# Patient Record
Sex: FEMALE | NOT HISPANIC OR LATINO | Employment: OTHER | URBAN - METROPOLITAN AREA
[De-identification: names, ages, dates, MRNs, and addresses within clinical notes are randomized per-mention and may not be internally consistent; named-entity substitution may affect disease eponyms.]

---

## 2017-08-16 ENCOUNTER — NEW REFERRAL (OUTPATIENT)
Dept: URBAN - METROPOLITAN AREA CLINIC 33 | Facility: CLINIC | Age: 82
End: 2017-08-16

## 2017-08-16 VITALS
BODY MASS INDEX: 34.96 KG/M2 | HEIGHT: 62 IN | HEART RATE: 65 BPM | SYSTOLIC BLOOD PRESSURE: 154 MMHG | DIASTOLIC BLOOD PRESSURE: 84 MMHG | WEIGHT: 190 LBS

## 2017-08-16 DIAGNOSIS — H04.123: ICD-10-CM

## 2017-08-16 DIAGNOSIS — H43.393: ICD-10-CM

## 2017-08-16 DIAGNOSIS — Z79.4: ICD-10-CM

## 2017-08-16 DIAGNOSIS — E11.3293: ICD-10-CM

## 2017-08-16 PROCEDURE — 2022F DILAT RTA XM EVC RTNOPTHY: CPT

## 2017-08-16 PROCEDURE — G8417 CALC BMI ABV UP PARAM F/U: HCPCS

## 2017-08-16 PROCEDURE — 92235 FLUORESCEIN ANGRPH MLTIFRAME: CPT

## 2017-08-16 PROCEDURE — 99204 OFFICE O/P NEW MOD 45 MIN: CPT

## 2017-08-16 PROCEDURE — 5010F MACUL RESULT PHY/QHP MNG DM: CPT

## 2017-08-16 PROCEDURE — 2021F DILAT MACULAR EXAM DONE: CPT

## 2017-08-16 PROCEDURE — G8427 DOCREV CUR MEDS BY ELIG CLIN: HCPCS

## 2017-08-16 PROCEDURE — 92250 FUNDUS PHOTOGRAPHY W/I&R: CPT

## 2017-08-16 PROCEDURE — G8397 DIL MACULA/FUNDUS EXAM/W DOC: HCPCS

## 2017-08-16 PROCEDURE — 1036F TOBACCO NON-USER: CPT

## 2017-08-16 ASSESSMENT — VISUAL ACUITY
OD_PH: 20/25+2
OD_SC: 20/25-2
OS_SC: 20/25-2
OD_CC: 20/25+1
OS_CC: 20/30+2

## 2017-08-16 ASSESSMENT — TONOMETRY
OD_IOP_MMHG: 17
OS_IOP_MMHG: 19

## 2020-08-24 ENCOUNTER — OFFICE VISIT (OUTPATIENT)
Dept: URBAN - METROPOLITAN AREA CLINIC 66 | Facility: CLINIC | Age: 85
End: 2020-08-24

## 2020-08-24 ENCOUNTER — TELEPHONE ENCOUNTER (OUTPATIENT)
Dept: URBAN - METROPOLITAN AREA CLINIC 68 | Facility: CLINIC | Age: 85
End: 2020-08-24

## 2020-09-23 ENCOUNTER — TELEPHONE ENCOUNTER (OUTPATIENT)
Dept: URBAN - METROPOLITAN AREA CLINIC 68 | Facility: CLINIC | Age: 85
End: 2020-09-23

## 2021-07-27 ENCOUNTER — OFFICE VISIT (OUTPATIENT)
Dept: URBAN - METROPOLITAN AREA CLINIC 68 | Facility: CLINIC | Age: 86
End: 2021-07-27

## 2021-07-30 NOTE — PATIENT DISCUSSION
Reviewed risks and benefits with patient of AREDS formulation for preventing progression of AMD. Also, reviewed pros and cons of Genotype based vitamin therapy for AMD. Patient instructed on the use of the [address] and asked to check their vision daily and to return for evaluation no later than 72 hours after any new changes. Patient instructed to avoid smoking, eat a healthy diet, exercise regularly if approved by their internist, monitor their weight and BMI and try and keep it in a normal range. For patients not on Coumadin eating a diet rich in green leafy vegtables is recommended.

## 2021-07-30 NOTE — PATIENT DISCUSSION
7/30/21: MOST LIKELY CAUSE IS PVD, BUT WILL MONITOR IN 3 MO FOR RESOLUTION. Recommended OBSERVATION. Patient understands condition, prognosis and need for follow up care.

## 2021-08-24 ENCOUNTER — OFFICE VISIT (OUTPATIENT)
Dept: URBAN - METROPOLITAN AREA CLINIC 68 | Facility: CLINIC | Age: 86
End: 2021-08-24

## 2021-10-28 ENCOUNTER — OFFICE VISIT (OUTPATIENT)
Dept: URBAN - METROPOLITAN AREA CLINIC 68 | Facility: CLINIC | Age: 86
End: 2021-10-28

## 2022-02-21 NOTE — PATIENT DISCUSSION
Reviewed risks and benefits with patient of AREDS formulation for preventing progression of AMD. Also, reviewed pros and cons of Genotype based vitamin therapy for AMD. Patient instructed on the use of the [address] and asked to check their vision daily and to return for evaluation no later than 72 hours after any new changes. Patient instructed to avoid smoking, eat a healthy diet, exercise regularly if approved by their internist, monitor their weight and BMI and try and keep it in a normal range. For patients not on Coumadin eating a diet rich in green leafy vegtables is recommended.

## 2022-06-04 ENCOUNTER — TELEPHONE ENCOUNTER (OUTPATIENT)
Dept: URBAN - METROPOLITAN AREA CLINIC 68 | Facility: CLINIC | Age: 87
End: 2022-06-04

## 2022-06-05 ENCOUNTER — TELEPHONE ENCOUNTER (OUTPATIENT)
Dept: URBAN - METROPOLITAN AREA CLINIC 68 | Facility: CLINIC | Age: 87
End: 2022-06-05

## 2022-06-05 RX ORDER — INSULIN GLARGINE 100 [IU]/ML
LANTUS SOLOSTAR( 100UNIT/ML SUBCUTANEOUS   ) ACTIVE -HX ENTRY INJECTION, SOLUTION SUBCUTANEOUS
Status: ACTIVE | COMMUNITY
Start: 2020-09-23

## 2022-06-05 RX ORDER — ERYTHROPOIETIN 40000 [IU]/ML
PROCRIT( 40000UNIT/ML INJECTION   ) ACTIVE -HX ENTRY INJECTION, SOLUTION INTRAVENOUS; SUBCUTANEOUS
Status: ACTIVE | COMMUNITY
Start: 2020-09-23

## 2022-06-05 RX ORDER — ALBUTEROL SULFATE 2.5 MG/3ML
ALBUTEROL SULFATE( (2.5 MG/3ML)0.083% INHALATION   ) ACTIVE -HX ENTRY SOLUTION RESPIRATORY (INHALATION)
Status: ACTIVE | COMMUNITY
Start: 2020-09-23

## 2022-06-05 RX ORDER — OMEPRAZOLE 20 MG/1
CAPSULE, DELAYED RELEASE ORAL DAILY
Qty: 90 | Refills: 90 | Status: ACTIVE | COMMUNITY
Start: 2020-08-24

## 2022-06-05 RX ORDER — CEFADROXIL 500 MG/1
CEFADROXIL( 500MG ORAL 1 DAILY ) ACTIVE -HX ENTRY CAPSULE ORAL DAILY
Status: ACTIVE | COMMUNITY
Start: 2021-07-27

## 2022-06-05 RX ORDER — LOSARTAN POTASSIUM 50 MG/1
LOSARTAN POTASSIUM( 50MG ORAL   ) ACTIVE -HX ENTRY TABLET ORAL
Status: ACTIVE | COMMUNITY
Start: 2020-09-23

## 2022-06-05 RX ORDER — ASPIRIN 81 MG/1
ASPIRIN LOW DOSE( 81MG ORAL   ) ACTIVE -HX ENTRY TABLET, COATED ORAL
Status: ACTIVE | COMMUNITY
Start: 2020-09-23

## 2022-06-05 RX ORDER — FLUTICASONE FUROATE AND VILANTEROL TRIFENATATE 100; 25 UG/1; UG/1
BREO ELLIPTA( 100-25MCG/INH INHALATION   ) ACTIVE -HX ENTRY POWDER RESPIRATORY (INHALATION)
Status: ACTIVE | COMMUNITY
Start: 2020-09-23

## 2022-06-05 RX ORDER — PANTOPRAZOLE SODIUM 40 MG/1
PROTONIX( 40MG ORAL   ) ACTIVE -HX ENTRY TABLET, DELAYED RELEASE ORAL
Status: ACTIVE | COMMUNITY
Start: 2020-09-23

## 2022-06-05 RX ORDER — MULTIVIT-MIN/FOLIC/VIT K/LYCOP 400-300MCG
VITAMIN D3( 50 MCG(2000 UT) ORAL  DAILY ) ACTIVE -HX ENTRY TABLET ORAL DAILY
Status: ACTIVE | COMMUNITY
Start: 2020-09-23

## 2022-06-05 RX ORDER — CARVEDILOL 25 MG/1
COREG( 25MG ORAL  DAILY ) ACTIVE -HX ENTRY TABLET, FILM COATED ORAL DAILY
Status: ACTIVE | COMMUNITY
Start: 2020-09-23

## 2022-06-05 RX ORDER — SITAGLIPTIN PHOSPHATE 100 MG
JANUVIA( 100MG ORAL  DAILY ) ACTIVE -HX ENTRY TABLET ORAL DAILY
Status: ACTIVE | COMMUNITY
Start: 2020-09-23

## 2022-06-05 RX ORDER — AMLODIPINE BESYLATE 5 MG/1
NORVASC( 5MG ORAL  DAILY ) ACTIVE -HX ENTRY TABLET ORAL DAILY
Status: ACTIVE | COMMUNITY
Start: 2020-09-23

## 2022-06-05 RX ORDER — ALBUTEROL SULFATE 90 UG/1
PROAIR HFA( 108 (90 BASE)MCG/ACT INHALATION   ) ACTIVE -HX ENTRY AEROSOL, METERED RESPIRATORY (INHALATION)
Status: ACTIVE | COMMUNITY
Start: 2020-09-23

## 2022-06-05 RX ORDER — FUROSEMIDE 20 MG/1
LASIX( 20MG ORAL   ) ACTIVE -HX ENTRY TABLET ORAL
Status: ACTIVE | COMMUNITY
Start: 2020-09-23

## 2022-06-25 ENCOUNTER — TELEPHONE ENCOUNTER (OUTPATIENT)
Age: 87
End: 2022-06-25

## 2022-06-26 ENCOUNTER — TELEPHONE ENCOUNTER (OUTPATIENT)
Age: 87
End: 2022-06-26

## 2022-06-26 RX ORDER — CEFADROXIL 500 MG/1
CEFADROXIL( 500MG ORAL 1 DAILY ) ACTIVE -HX ENTRY CAPSULE ORAL DAILY
Status: ACTIVE | COMMUNITY
Start: 2021-07-27

## 2022-06-26 RX ORDER — ALBUTEROL SULFATE 2.5 MG/3ML
ALBUTEROL SULFATE( (2.5 MG/3ML)0.083% INHALATION   ) ACTIVE -HX ENTRY SOLUTION RESPIRATORY (INHALATION)
Status: ACTIVE | COMMUNITY
Start: 2020-09-23

## 2022-06-26 RX ORDER — OMEPRAZOLE 20 MG/1
CAPSULE, DELAYED RELEASE ORAL DAILY
Qty: 90 | Refills: 90 | Status: ACTIVE | COMMUNITY
Start: 2020-08-24

## 2022-06-26 RX ORDER — CHLORHEXIDINE GLUCONATE 4 %
FERROUS SULFATE( 325 (65 FE)MG ORAL   ) ACTIVE -HX ENTRY LIQUID (ML) TOPICAL
Status: ACTIVE | COMMUNITY
Start: 2020-09-23

## 2022-06-26 RX ORDER — LOSARTAN POTASSIUM 50 MG/1
LOSARTAN POTASSIUM( 50MG ORAL   ) ACTIVE -HX ENTRY TABLET, FILM COATED ORAL
Status: ACTIVE | COMMUNITY
Start: 2020-09-23

## 2022-06-26 RX ORDER — ASPIRIN 81 MG/1
ASPIRIN LOW DOSE( 81MG ORAL   ) ACTIVE -HX ENTRY TABLET, COATED ORAL
Status: ACTIVE | COMMUNITY
Start: 2020-09-23

## 2022-06-26 RX ORDER — ERYTHROPOIETIN 40000 [IU]/ML
PROCRIT( 40000UNIT/ML INJECTION   ) ACTIVE -HX ENTRY INJECTION, SOLUTION INTRAVENOUS; SUBCUTANEOUS
Status: ACTIVE | COMMUNITY
Start: 2020-09-23

## 2022-06-26 RX ORDER — FLUTICASONE FUROATE AND VILANTEROL TRIFENATATE 100; 25 UG/1; UG/1
BREO ELLIPTA( 100-25MCG/INH INHALATION   ) ACTIVE -HX ENTRY POWDER RESPIRATORY (INHALATION)
Status: ACTIVE | COMMUNITY
Start: 2020-09-23

## 2022-06-26 RX ORDER — FUROSEMIDE 20 MG/1
LASIX( 20MG ORAL   ) ACTIVE -HX ENTRY TABLET ORAL
Status: ACTIVE | COMMUNITY
Start: 2020-09-23

## 2022-06-26 RX ORDER — INSULIN GLARGINE 100 [IU]/ML
LANTUS SOLOSTAR( 100UNIT/ML SUBCUTANEOUS   ) ACTIVE -HX ENTRY INJECTION, SOLUTION SUBCUTANEOUS
Status: ACTIVE | COMMUNITY
Start: 2020-09-23

## 2022-06-26 RX ORDER — CARVEDILOL 25 MG/1
COREG( 25MG ORAL  DAILY ) ACTIVE -HX ENTRY TABLET, FILM COATED ORAL DAILY
Status: ACTIVE | COMMUNITY
Start: 2020-09-23

## 2022-06-26 RX ORDER — MULTIVIT-MIN/FOLIC/VIT K/LYCOP 400-300MCG
VITAMIN D3( 50 MCG(2000 UT) ORAL  DAILY ) ACTIVE -HX ENTRY TABLET ORAL DAILY
Status: ACTIVE | COMMUNITY
Start: 2020-09-23

## 2022-06-26 RX ORDER — SITAGLIPTIN PHOSPHATE 100 MG
JANUVIA( 100MG ORAL  DAILY ) ACTIVE -HX ENTRY TABLET ORAL DAILY
Status: ACTIVE | COMMUNITY
Start: 2020-09-23

## 2022-06-26 RX ORDER — HYALURONATE SODIUM 16 MG/ML
MVI(    DAILY ) ACTIVE -HX ENTRY SYRINGE (ML) INTRAOCULAR DAILY
Status: ACTIVE | COMMUNITY
Start: 2020-09-23

## 2022-07-09 ENCOUNTER — TELEPHONE ENCOUNTER (OUTPATIENT)
Dept: URBAN - METROPOLITAN AREA CLINIC 121 | Facility: CLINIC | Age: 87
End: 2022-07-09

## 2022-07-09 RX ORDER — ASPIRIN 81 MG/1
TABLET, CHEWABLE ORAL ONCE A DAY
Refills: 0 | OUTPATIENT
Start: 2013-12-17 | End: 2019-04-23

## 2022-07-09 RX ORDER — VALSARTAN AND HYDROCHLOROTHIAZIDE 320; 12.5 MG/1; MG/1
TABLET, FILM COATED ORAL ONCE A DAY
Refills: 0 | OUTPATIENT
Start: 2013-12-17 | End: 2019-04-23

## 2022-07-09 RX ORDER — CARVEDILOL 25 MG/1
TABLET, FILM COATED ORAL ONCE A DAY
Refills: 0 | OUTPATIENT
Start: 2013-12-17 | End: 2019-04-23

## 2022-07-09 RX ORDER — SITAGLIPTIN 50 MG/1
TABLET, FILM COATED ORAL ONCE A DAY
Refills: 0 | OUTPATIENT
Start: 2013-12-17 | End: 2019-04-23

## 2022-07-09 RX ORDER — CHLORHEXIDINE GLUCONATE 4 %
LIQUID (ML) TOPICAL ONCE A DAY
Refills: 0 | OUTPATIENT
Start: 2013-12-17 | End: 2019-04-23

## 2022-07-10 ENCOUNTER — TELEPHONE ENCOUNTER (OUTPATIENT)
Dept: URBAN - METROPOLITAN AREA CLINIC 121 | Facility: CLINIC | Age: 87
End: 2022-07-10

## 2022-07-10 RX ORDER — ASPIRIN 81 MG/1
TABLET, CHEWABLE ORAL ONCE A DAY
Refills: 0 | Status: ACTIVE | COMMUNITY
Start: 2019-04-23

## 2022-07-10 RX ORDER — CHLORHEXIDINE GLUCONATE 4 %
LIQUID (ML) TOPICAL TWICE A DAY
Refills: 0 | Status: ACTIVE | COMMUNITY
Start: 2019-04-23

## 2022-07-10 RX ORDER — SITAGLIPTIN PHOSPHATE 100 MG
TABLET ORAL ONCE A DAY
Refills: 0 | Status: ACTIVE | COMMUNITY
Start: 2019-04-23

## 2022-07-10 RX ORDER — CARVEDILOL 25 MG/1
TABLET, FILM COATED ORAL TWICE A DAY
Refills: 0 | Status: ACTIVE | COMMUNITY
Start: 2019-04-23

## 2022-07-10 RX ORDER — OMEGA-3/DHA/EPA/FISH OIL 1000 MG
CAPSULE ORAL ONCE A DAY
Refills: 0 | Status: ACTIVE | COMMUNITY
Start: 2019-04-23

## 2022-07-10 RX ORDER — LOSARTAN POTASSIUM 50 MG/1
TABLET, FILM COATED ORAL TWICE A DAY
Refills: 0 | Status: ACTIVE | COMMUNITY
Start: 2019-04-23

## 2022-07-10 RX ORDER — CHLORHEXIDINE GLUCONATE 4 %
LIQUID (ML) TOPICAL ONCE A DAY
Refills: 0 | Status: ACTIVE | COMMUNITY
Start: 2019-04-23

## 2023-01-23 ENCOUNTER — OFFICE VISIT (OUTPATIENT)
Dept: URBAN - METROPOLITAN AREA CLINIC 68 | Facility: CLINIC | Age: 88
End: 2023-01-23

## 2023-01-23 RX ORDER — SITAGLIPTIN 50 MG/1
JANUVIA( 100MG ORAL  DAILY ) ACTIVE -HX ENTRY TABLET, FILM COATED ORAL DAILY
Status: ACTIVE | COMMUNITY
Start: 2020-09-23

## 2023-01-23 RX ORDER — MULTIVITAMIN WITH IRON
1 TABLET WITH A MEAL TABLET ORAL ONCE A DAY
Status: ACTIVE | COMMUNITY

## 2023-01-23 RX ORDER — LOSARTAN POTASSIUM 50 MG/1
1 TABLET TABLET ORAL ONCE A DAY
Status: ACTIVE | COMMUNITY

## 2023-01-23 RX ORDER — FLUTICASONE FUROATE AND VILANTEROL TRIFENATATE 100; 25 UG/1; UG/1
BREO ELLIPTA( 100-25MCG/INH INHALATION   ) ACTIVE -HX ENTRY POWDER RESPIRATORY (INHALATION)
Status: ACTIVE | COMMUNITY
Start: 2020-09-23

## 2023-01-23 RX ORDER — INSULIN GLARGINE 100 [IU]/ML
LANTUS SOLOSTAR( 100UNIT/ML SUBCUTANEOUS   ) ACTIVE -HX ENTRY INJECTION, SOLUTION SUBCUTANEOUS
Status: DISCONTINUED | COMMUNITY
Start: 2020-09-23

## 2023-01-23 RX ORDER — SOD SULF/POT CHLORIDE/MAG SULF 1.479 G
AS DIRECTED TABLET ORAL ONCE
Qty: 1 PACKET | Refills: 0 | OUTPATIENT
Start: 2023-01-23

## 2023-01-23 RX ORDER — ALBUTEROL SULFATE 2.5 MG/3ML
ALBUTEROL SULFATE( (2.5 MG/3ML)0.083% INHALATION   ) ACTIVE -HX ENTRY SOLUTION RESPIRATORY (INHALATION)
Status: ACTIVE | COMMUNITY
Start: 2020-09-23

## 2023-01-23 RX ORDER — ASPIRIN 81 MG/1
ASPIRIN LOW DOSE( 81MG ORAL   ) ACTIVE -HX ENTRY TABLET, COATED ORAL
Status: ACTIVE | COMMUNITY
Start: 2020-09-23

## 2023-01-23 RX ORDER — REPAGLINIDE 0.5 MG/1
1 TABLET 15 TO 30 MINUTES BEFORE MEALS TABLET ORAL TWICE A DAY
Status: ACTIVE | COMMUNITY

## 2023-01-23 RX ORDER — OMEPRAZOLE 20 MG/1
CAPSULE, DELAYED RELEASE ORAL DAILY
Qty: 90 | Refills: 90 | Status: ACTIVE | COMMUNITY
Start: 2020-08-24

## 2023-01-23 RX ORDER — LOSARTAN POTASSIUM 50 MG/1
LOSARTAN POTASSIUM( 50MG ORAL   ) ACTIVE -HX ENTRY TABLET ORAL
Status: ACTIVE | COMMUNITY
Start: 2020-09-23

## 2023-01-23 RX ORDER — ALBUTEROL SULFATE 90 UG/1
PROAIR HFA( 108 (90 BASE)MCG/ACT INHALATION   ) ACTIVE -HX ENTRY AEROSOL, METERED RESPIRATORY (INHALATION)
Status: ACTIVE | COMMUNITY
Start: 2020-09-23

## 2023-01-23 RX ORDER — ERYTHROPOIETIN 40000 [IU]/ML
PROCRIT( 40000UNIT/ML INJECTION   ) ACTIVE -HX ENTRY INJECTION, SOLUTION INTRAVENOUS; SUBCUTANEOUS
Status: ACTIVE | COMMUNITY
Start: 2020-09-23

## 2023-01-23 RX ORDER — FUROSEMIDE 20 MG/1
LASIX( 20MG ORAL   ) ACTIVE -HX ENTRY TABLET ORAL
Status: ACTIVE | COMMUNITY
Start: 2020-09-23

## 2023-01-23 RX ORDER — CARVEDILOL 25 MG/1
COREG( 25MG ORAL  DAILY ) ACTIVE -HX ENTRY TABLET, FILM COATED ORAL DAILY
Status: ACTIVE | COMMUNITY
Start: 2020-09-23

## 2023-01-23 RX ORDER — AMLODIPINE BESYLATE 5 MG/1
NORVASC( 5MG ORAL  DAILY ) ACTIVE -HX ENTRY TABLET ORAL DAILY
Status: ACTIVE | COMMUNITY
Start: 2020-09-23

## 2023-01-23 RX ORDER — CEFADROXIL 500 MG/1
CEFADROXIL( 500MG ORAL 1 DAILY ) ACTIVE -HX ENTRY CAPSULE ORAL DAILY
Status: ACTIVE | COMMUNITY
Start: 2021-07-27

## 2023-01-23 RX ORDER — MULTIVIT-MIN/FOLIC/VIT K/LYCOP 400-300MCG
VITAMIN D3( 50 MCG(2000 UT) ORAL  DAILY ) ACTIVE -HX ENTRY TABLET ORAL DAILY
Status: ACTIVE | COMMUNITY
Start: 2020-09-23

## 2023-01-23 RX ORDER — PANTOPRAZOLE SODIUM 40 MG/1
PROTONIX( 40MG ORAL   ) ACTIVE -HX ENTRY TABLET, DELAYED RELEASE ORAL
Status: ACTIVE | COMMUNITY
Start: 2020-09-23

## 2023-01-23 NOTE — HPI-MIGRATED HPI
Transition of Care : Patient evaluated due to Rectal bleeding.  Referred having intermittent episodes of painless bleeding.  Denies nausea, vomits, dysphagia, odynophagia, heartburn, abdominal pain, diarrhea, constipation or weight loss

## 2023-01-27 ENCOUNTER — TELEPHONE ENCOUNTER (OUTPATIENT)
Dept: URBAN - METROPOLITAN AREA CLINIC 68 | Facility: CLINIC | Age: 88
End: 2023-01-27

## 2023-02-23 ENCOUNTER — OFFICE VISIT (OUTPATIENT)
Dept: URBAN - METROPOLITAN AREA SURGERY CENTER 12 | Facility: SURGERY CENTER | Age: 88
End: 2023-02-23

## 2023-03-03 ENCOUNTER — OFFICE VISIT (OUTPATIENT)
Dept: URBAN - METROPOLITAN AREA CLINIC 68 | Facility: CLINIC | Age: 88
End: 2023-03-03

## 2023-03-03 RX ORDER — LOSARTAN POTASSIUM 50 MG/1
1 TABLET TABLET ORAL ONCE A DAY
Status: ON HOLD | COMMUNITY

## 2023-03-03 RX ORDER — FLUTICASONE FUROATE AND VILANTEROL TRIFENATATE 100; 25 UG/1; UG/1
BREO ELLIPTA( 100-25MCG/INH INHALATION   ) ACTIVE -HX ENTRY POWDER RESPIRATORY (INHALATION)
Status: ON HOLD | COMMUNITY
Start: 2020-09-23

## 2023-03-03 RX ORDER — MULTIVIT-MIN/FOLIC/VIT K/LYCOP 400-300MCG
VITAMIN D3( 50 MCG(2000 UT) ORAL  DAILY ) ACTIVE -HX ENTRY TABLET ORAL DAILY
Status: ON HOLD | COMMUNITY
Start: 2020-09-23

## 2023-03-03 RX ORDER — OMEPRAZOLE 20 MG/1
CAPSULE, DELAYED RELEASE ORAL DAILY
Qty: 90 | Refills: 90 | Status: ON HOLD | COMMUNITY
Start: 2020-08-24

## 2023-03-03 RX ORDER — ALBUTEROL SULFATE 90 UG/1
PROAIR HFA( 108 (90 BASE)MCG/ACT INHALATION   ) ACTIVE -HX ENTRY AEROSOL, METERED RESPIRATORY (INHALATION)
Status: ACTIVE | COMMUNITY
Start: 2020-09-23

## 2023-03-03 RX ORDER — CARVEDILOL 25 MG/1
COREG( 25MG ORAL  DAILY ) ACTIVE -HX ENTRY TABLET, FILM COATED ORAL DAILY
Status: ACTIVE | COMMUNITY
Start: 2020-09-23

## 2023-03-03 RX ORDER — ERYTHROPOIETIN 40000 [IU]/ML
PROCRIT( 40000UNIT/ML INJECTION   ) ACTIVE -HX ENTRY INJECTION, SOLUTION INTRAVENOUS; SUBCUTANEOUS
Status: ON HOLD | COMMUNITY
Start: 2020-09-23

## 2023-03-03 RX ORDER — MULTIVITAMIN WITH IRON
1 TABLET WITH A MEAL TABLET ORAL ONCE A DAY
Status: ON HOLD | COMMUNITY

## 2023-03-03 RX ORDER — ASPIRIN 81 MG/1
ASPIRIN LOW DOSE( 81MG ORAL   ) ACTIVE -HX ENTRY TABLET, COATED ORAL
Status: ON HOLD | COMMUNITY
Start: 2020-09-23

## 2023-03-03 RX ORDER — ALBUTEROL SULFATE 2.5 MG/3ML
ALBUTEROL SULFATE( (2.5 MG/3ML)0.083% INHALATION   ) ACTIVE -HX ENTRY SOLUTION RESPIRATORY (INHALATION)
Status: ACTIVE | COMMUNITY
Start: 2020-09-23

## 2023-03-03 RX ORDER — FUROSEMIDE 20 MG/1
LASIX( 20MG ORAL   ) ACTIVE -HX ENTRY TABLET ORAL
Status: ACTIVE | COMMUNITY
Start: 2020-09-23

## 2023-03-03 RX ORDER — REPAGLINIDE 0.5 MG/1
1 TABLET 15 TO 30 MINUTES BEFORE MEALS TABLET ORAL TWICE A DAY
Status: ON HOLD | COMMUNITY

## 2023-03-03 RX ORDER — SITAGLIPTIN 25 MG/1
JANUVIA( 100MG ORAL  DAILY ) ACTIVE -HX ENTRY TABLET, FILM COATED ORAL DAILY
Status: ACTIVE | COMMUNITY
Start: 2020-09-23

## 2023-03-03 RX ORDER — INSULIN GLARGINE 100 [IU]/ML
AS DIRECTED INJECTION, SOLUTION SUBCUTANEOUS
Status: ACTIVE | COMMUNITY

## 2023-03-03 RX ORDER — LOSARTAN POTASSIUM 50 MG/1
LOSARTAN POTASSIUM( 50MG ORAL   ) ACTIVE -HX ENTRY TABLET ORAL
Status: ACTIVE | COMMUNITY
Start: 2020-09-23

## 2023-03-03 RX ORDER — SOD SULF/POT CHLORIDE/MAG SULF 1.479 G
AS DIRECTED TABLET ORAL ONCE
Qty: 1 PACKET | Refills: 0 | Status: ACTIVE | COMMUNITY
Start: 2023-01-23

## 2023-03-03 RX ORDER — PANTOPRAZOLE SODIUM 40 MG/1
PROTONIX( 40MG ORAL   ) ACTIVE -HX ENTRY TABLET, DELAYED RELEASE ORAL
Status: ACTIVE | COMMUNITY
Start: 2020-09-23

## 2023-03-03 RX ORDER — AMLODIPINE BESYLATE 5 MG/1
NORVASC( 5MG ORAL  DAILY ) ACTIVE -HX ENTRY TABLET ORAL DAILY
Status: ON HOLD | COMMUNITY
Start: 2020-09-23

## 2023-03-03 RX ORDER — CEFADROXIL 500 MG/1
CEFADROXIL( 500MG ORAL 1 DAILY ) ACTIVE -HX ENTRY CAPSULE ORAL DAILY
Status: ON HOLD | COMMUNITY
Start: 2021-07-27

## 2023-03-03 NOTE — HPI-MIGRATED HPI
Transition of Care : Patient evaluated after having recent EGD/colonoscopy at [facility] by Dr Armendariz. EGD found with bleeding gastric ulcer s/p hemostatis.  Denies nausea, vomits, dysphagia, odynophagia, heartburn, abdominal pain, diarrhea, constipation GI bleeding or weight loss

## 2023-04-20 ENCOUNTER — OFFICE VISIT (OUTPATIENT)
Dept: URBAN - METROPOLITAN AREA SURGERY CENTER 12 | Facility: SURGERY CENTER | Age: 88
End: 2023-04-20